# Patient Record
Sex: FEMALE | Race: WHITE | NOT HISPANIC OR LATINO | Employment: UNEMPLOYED | URBAN - METROPOLITAN AREA
[De-identification: names, ages, dates, MRNs, and addresses within clinical notes are randomized per-mention and may not be internally consistent; named-entity substitution may affect disease eponyms.]

---

## 2024-05-01 ENCOUNTER — OFFICE VISIT (OUTPATIENT)
Dept: URGENT CARE | Facility: CLINIC | Age: 24
End: 2024-05-01

## 2024-05-01 VITALS
DIASTOLIC BLOOD PRESSURE: 68 MMHG | HEART RATE: 123 BPM | TEMPERATURE: 99.3 F | SYSTOLIC BLOOD PRESSURE: 102 MMHG | WEIGHT: 138.6 LBS | BODY MASS INDEX: 21.01 KG/M2 | HEIGHT: 68 IN | RESPIRATION RATE: 16 BRPM | OXYGEN SATURATION: 94 %

## 2024-05-01 DIAGNOSIS — R51.9 ACUTE INTRACTABLE HEADACHE, UNSPECIFIED HEADACHE TYPE: ICD-10-CM

## 2024-05-01 DIAGNOSIS — J03.90 EXUDATIVE TONSILLITIS: ICD-10-CM

## 2024-05-01 PROCEDURE — 3074F SYST BP LT 130 MM HG: CPT | Performed by: PHYSICIAN ASSISTANT

## 2024-05-01 PROCEDURE — 99203 OFFICE O/P NEW LOW 30 MIN: CPT | Performed by: PHYSICIAN ASSISTANT

## 2024-05-01 PROCEDURE — 3078F DIAST BP <80 MM HG: CPT | Performed by: PHYSICIAN ASSISTANT

## 2024-05-01 RX ORDER — PREDNISONE 20 MG/1
TABLET ORAL
Qty: 10 TABLET | Refills: 0 | Status: SHIPPED | OUTPATIENT
Start: 2024-05-01

## 2024-05-01 RX ORDER — AMOXICILLIN AND CLAVULANATE POTASSIUM 875; 125 MG/1; MG/1
1 TABLET, FILM COATED ORAL 2 TIMES DAILY
Qty: 20 TABLET | Refills: 0 | Status: SHIPPED | OUTPATIENT
Start: 2024-05-01 | End: 2024-05-11

## 2024-05-01 ASSESSMENT — ENCOUNTER SYMPTOMS
FEVER: 1
DIARRHEA: 0
ABDOMINAL PAIN: 0
VOMITING: 0
CHILLS: 1
HEADACHES: 1
NAUSEA: 0
MYALGIAS: 1
STRIDOR: 0
SORE THROAT: 1
COUGH: 0
SHORTNESS OF BREATH: 0

## 2024-05-01 NOTE — PROGRESS NOTES
Subjective     Mey Vo is a 24 y.o. female who presents with Headache (Pt reports frontal headache x2d. Pt states the pain is worse than typical headaches. ) and Oral Swelling (Pt reports swollen tonsils, and painful swallowing x2d.)    HPI:  Mey Vo is a 24 y.o. female who presents today for evaluation of headache and sore throat.  Patient reports that she has been feeling sick for the past 2 to 3 days.  She has had fairly significant frontal/pressure headache that is worse than her typical headaches.  She says that she has also had sore throat with swollen tonsils during this time.  She has not measured her temperature but has had chills, fatigue, and bodyaches.  Took some ibuprofen last night which did not help very much.  Did not have any other medication to try this morning.  No vomiting or diarrhea.  No cough.  Mild congestion.  Patient reports that she has a history of frequent/recurrent tonsillitis.  Notes that she had tonsillitis approximately 10 times back in 2018 when she lived in [place].  Says that she saw an ear nose and throat specialist when she moved back to the [facility] but they did not have any of her records so did not recommend proceeding with surgery.  She says that she does not get it quite as frequently but over the last few years has gotten flareups at least every 6 months.  She says that last month she noted twice that it seems that her tonsils.  To get inflamed and she had a sore throat but she was able to manage it with over-the-counter medication that symptoms went away.  Over the past few days, however, it seems like it is worse than it was and this in combination with the headache prompted her to come in for evaluation.        Review of Systems   Constitutional:  Positive for chills, fever (subjective fever) and malaise/fatigue.   HENT:  Positive for congestion and sore throat.    Respiratory:  Negative for cough, shortness of breath and stridor.   "  Gastrointestinal:  Negative for abdominal pain, diarrhea, nausea and vomiting.   Musculoskeletal:  Positive for myalgias.   Neurological:  Positive for headaches.           PMH:  has no past medical history on file.  MEDS:   Current Outpatient Medications:     predniSONE (DELTASONE) 20 MG Tab, Take 2 tabs every morning for 5 days, Disp: 10 Tablet, Rfl: 0    amoxicillin-clavulanate (AUGMENTIN) 875-125 MG Tab, Take 1 Tablet by mouth 2 times a day for 10 days., Disp: 20 Tablet, Rfl: 0  ALLERGIES: No Known Allergies  SURGHX: History reviewed. No pertinent surgical history.  SOCHX:  reports that she has never smoked. She has never used smokeless tobacco. She reports that she does not currently use alcohol. She reports current drug use.  FH: Family history was reviewed, no pertinent findings to report        Objective     /68   Pulse (!) 123   Temp 37.4 °C (99.3 °F) (Temporal)   Resp 16   Ht 1.727 m (5' 8\")   Wt 62.9 kg (138 lb 9.6 oz)   SpO2 94%   BMI 21.07 kg/m²      Physical Exam  Constitutional:       Appearance: She is well-developed.   HENT:      Head: Normocephalic and atraumatic.      Right Ear: Tympanic membrane, ear canal and external ear normal.      Left Ear: Tympanic membrane, ear canal and external ear normal.      Nose: Mucosal edema and congestion present. No rhinorrhea.      Mouth/Throat:      Lips: Pink.      Mouth: Mucous membranes are moist.      Pharynx: Uvula midline. Oropharyngeal exudate and posterior oropharyngeal erythema present. No uvula swelling.      Tonsils: Tonsillar exudate present. No tonsillar abscesses. 1+ on the right. 2+ on the left.   Eyes:      Conjunctiva/sclera: Conjunctivae normal.      Pupils: Pupils are equal, round, and reactive to light.   Cardiovascular:      Rate and Rhythm: Regular rhythm. Tachycardia present.      Heart sounds: Normal heart sounds. No murmur heard.  Pulmonary:      Effort: Pulmonary effort is normal.      Breath sounds: Normal breath " sounds. No wheezing.   Musculoskeletal:      Cervical back: Normal range of motion.   Lymphadenopathy:      Cervical: Cervical adenopathy present.   Skin:     General: Skin is warm and dry.      Capillary Refill: Capillary refill takes less than 2 seconds.   Neurological:      Mental Status: She is alert and oriented to person, place, and time.   Psychiatric:         Behavior: Behavior normal.         Judgment: Judgment normal.             Assessment & Plan     1. Exudative tonsillitis  - predniSONE (DELTASONE) 20 MG Tab; Take 2 tabs every morning for 5 days  Dispense: 10 Tablet; Refill: 0  - amoxicillin-clavulanate (AUGMENTIN) 875-125 MG Tab; Take 1 Tablet by mouth 2 times a day for 10 days.  Dispense: 20 Tablet; Refill: 0  Patient presenting for a few days of sore throat, tonsillar swelling, headache, and systemic symptoms of subjective fever, chills, body aches.  Her symptoms and exam findings are highly concerning for possibility of bacterial pharyngitis.  Patient will therefore be empirically started on Augmentin.  Prednisone also prescribed to help with the tonsillar inflammation.  -Supportive care discussed to include salt water gargles, throat lozenges, and increased fluid intake  - Tylenol or ibuprofen as needed for fever > 100.4 F  Discussed with patient that they are contagious until they been on the antibiotics for at least 24 hours.  Also recommend that they switch their toothbrush out after being on the antibiotics for 2 to 3 days.    2. Acute intractable headache, unspecified headache type  -Likely secondary to the probable strep pharyngitis.  Did offer Toradol injection from the urgent care but she would prefer to see if her symptoms start to resolve with the prednisone and antibiotics first.          Differential Diagnosis, natural history, and supportive care discussed. Return to the Urgent Care or follow up with your PCP if symptoms fail to resolve, or for any new or worsening symptoms. Emergency  room precautions discussed. Patient and/or family appears understanding of information.